# Patient Record
Sex: FEMALE | Race: WHITE | Employment: FULL TIME | URBAN - METROPOLITAN AREA
[De-identification: names, ages, dates, MRNs, and addresses within clinical notes are randomized per-mention and may not be internally consistent; named-entity substitution may affect disease eponyms.]

---

## 2017-08-12 ENCOUNTER — HOSPITAL ENCOUNTER (OUTPATIENT)
Dept: MAMMOGRAPHY | Age: 42
Discharge: HOME | End: 2017-08-12
Attending: OBSTETRICS & GYNECOLOGY
Payer: COMMERCIAL

## 2017-08-12 DIAGNOSIS — Z12.31 VISIT FOR SCREENING MAMMOGRAM: ICD-10-CM

## 2017-08-12 PROCEDURE — 77067 SCR MAMMO BI INCL CAD: CPT

## 2018-10-05 ENCOUNTER — HOSPITAL ENCOUNTER (OUTPATIENT)
Dept: MAMMOGRAPHY | Age: 43
Discharge: HOME | End: 2018-10-05
Attending: OBSTETRICS & GYNECOLOGY
Payer: COMMERCIAL

## 2018-10-05 DIAGNOSIS — Z12.31 VISIT FOR SCREENING MAMMOGRAM: ICD-10-CM

## 2018-10-05 PROCEDURE — 77063 BREAST TOMOSYNTHESIS BI: CPT

## 2018-11-13 PROBLEM — E66.01 OBESITY, MORBID (HCC): Status: ACTIVE | Noted: 2018-11-13

## 2020-03-07 ENCOUNTER — HOSPITAL ENCOUNTER (OUTPATIENT)
Dept: MAMMOGRAPHY | Age: 45
Discharge: HOME | End: 2020-03-07
Attending: OBSTETRICS & GYNECOLOGY
Payer: COMMERCIAL

## 2020-03-07 DIAGNOSIS — Z12.31 VISIT FOR SCREENING MAMMOGRAM: ICD-10-CM

## 2020-03-07 PROCEDURE — 77063 BREAST TOMOSYNTHESIS BI: CPT

## 2020-04-28 VITALS — HEIGHT: 61 IN | WEIGHT: 250 LBS | BODY MASS INDEX: 47.2 KG/M2

## 2020-04-28 RX ORDER — MELATONIN
2000 DAILY
COMMUNITY

## 2020-04-28 NOTE — PERIOP NOTES
Patient verified name and . Order for consent NOT found in EHR; patient verifies procedure. Type 1B surgery, phone assessment complete. Orders NOT received. Labs per surgeon: unknown; no orders received in EHR at time of assessment. Labs per anesthesia protocol: none needed. Patient answered medical/surgical history questions at their best of ability. All prior to admission medications documented in [facility]. Patient instructed to take the following medications the day of surgery according to anesthesia guidelines with a small sip of water: none. Hold all vitamins 7 days prior to surgery and NSAIDS 5 days prior to surgery. Prescription meds to hold: none. Patient instructed on the following:  Visitor policy- 1 visitor per per patient. Arrive at [facility], time of arrival to be called the day before by 1700  NPO after midnight including gum, mints, and ice chips  Responsible adult must drive patient to the hospital, stay during surgery, and patient will need supervision 24 hours after anesthesia  Use dial antibacterial soap in shower the night before surgery and on the morning of surgery  All piercings must be removed prior to arrival.    Leave all valuables (money and jewelry) at home but bring insurance card and ID on DOS. Do not wear make-up, nail polish, lotions, cologne, perfumes, powders, or oil on skin. Patient teach back successful and patient demonstrates knowledge of instruction.

## 2020-05-01 ENCOUNTER — HOSPITAL ENCOUNTER (OUTPATIENT)
Dept: SURGERY | Age: 45
Discharge: HOME | End: 2020-05-01

## 2020-05-03 PROBLEM — N92.3 IMB (INTERMENSTRUAL BLEEDING): Status: ACTIVE | Noted: 2020-02-04

## 2020-05-03 RX ORDER — SODIUM CHLORIDE 0.9 % (FLUSH) 0.9 %
5-40 SYRINGE (ML) INJECTION EVERY 8 HOURS
Status: CANCELLED | OUTPATIENT
Start: 2020-05-03

## 2020-05-03 RX ORDER — SODIUM CHLORIDE 0.9 % (FLUSH) 0.9 %
5-40 SYRINGE (ML) INJECTION AS NEEDED
Status: CANCELLED | OUTPATIENT
Start: 2020-05-03

## 2020-05-03 NOTE — H&P (VIEW-ONLY)
Pre op  Exam 
 
Alexandra Awan 1975 
 
38 y.o. 
[id number] Presents for pre op. Periods occur every 2 weeks with spotting ~1-2 days between periods. CD 1 & 2 menorrhagia, flow lasts ~6 days. Sometimes has cramping when she is not having her period Problem List  Date Reviewed: 5/3/2020 Codes Class Noted IMB (intermenstrual bleeding) ICD-10-CM: N92.3 ICD-9-CM: 626.6  2020 Overview Addendum 5/3/2020 12:11 PM by Con Dennis MD  
  2020:  Regular menses w/ IMB. Currently is not sexually active. A/P:  Menorrhagia and IMB/spotting. Recheck US and same day EMB. Pt very uncomfortable w/ SSE, Rxs for pre-procedure analgesia given. Pt off work 20, agrees to come in for US/EMB that day. 2020  HPI: BMI 51, IMB, menorrhagia, took analgesia and used Cytotec last night A/P:  1. Menorrhagia, IMB, BMI 51 unable to do EMB, schedule D & C Hysteroscopy with Mirena IUD placement for control of VB, has not been sexually active for several years. Insert Cytotec 800 mcg intravaginal the night before surgery (No impact w/ 400 mcg night before  attempted EMB) US today:  ENLARGED HETEROGENEOUS UTERUS 9.4/5/5 cm WITH 3 FIBROIDS FIBROIDS 1,2 - INTRAMURAL FIBROID 3- SUBSEROSAL (BETTER DETAIL WITH ABD APPROACH) ENDOMETRIUM- 7.4MM RT OV WNL 
LT OV WNL 
RT/LT OVS SEEN WITH BETTER DETAIL WITH ABD APPROACH 
MIN FREE FLUID 
  
postmenopausal w/ VB:  Thick endometrium (> 5 mm)-->7%  risk of cancer 
thin endometrium  (< or = 5 mm)---> < 0.07% cancer risk. Obesity, morbid ([address] ICD-10-CM: E66.01 
ICD-9-CM: 278.01  2018 Patient's last menstrual period was 2020 (approximate). The current method of family planning is abstinence. Last pap:   nl 
 
Endometrial Biopsy:  Could not be collected/failed procedure. OB History  Para Term  AB Living 1 1 1 0 0 1 SAB TAB Ectopic Molar Multiple Live Births 0 0 0 0 0 1 # Outcome Date GA Lbr Gurinder/2nd Weight Sex Delivery Anes PTL Lv  
1 Term 05 40w0d  9 lb 8 oz (4.309 kg) M  Spinal  CLAIR Name: Mikey Barker Past Medical History:  
Diagnosis Date  BMI 50.0-59.9, adult ([facility] [address] 2019 BMI=47.3  Hypertension   
 on med for control Past Surgical History:  
Procedure Laterality Date  HX  SECTION  2005  HX CHOLECYSTECTOMY  2017  HX ORTHOPAEDIC Right 1984  
 tumor removed from foot  HX OTHER SURGICAL  2006 SKIN CANCER REMOVAL Review of Systems Constitutional: Negative. HENT: Negative. Eyes: Negative. Respiratory: Negative. Cardiovascular: Negative. Gastrointestinal: negative Genitourinary: IMB, pelvic pain Musculoskeletal: Negative. Skin: Negative. Neurological: Negative. Endo/Heme/Allergies: Negative. Psychiatric/Behavioral: Negative. Physical Exam: 
Visit Vitals /74 Ht 5' 1\" (1.549 m) Wt 265 lb (120.2 kg) LMP 2020 (Approximate) BMI 50.07 kg/m² Yulisa Valdivia is a 38 y.o. female who appears pleasant, in no apparent distress, her given age, well developed, well nourished and with good attention to hygiene and body habitus. Oriented to person, place and time. Mood and affect normal and appropriate to situation. Head is normocephalic, atraumatic, without any gross head or neck masses. Neck: Neck exam reveals no abnormalities. Thyroid examination reveals no abnormalities. Lymph nodes:  
Neck lymph nodes are normal.  
No supraclavicular lymphadenopathy noted. No axillary lymphadenopathy noted. No groin lymphadenopathy noted. Respiratory: Assessment of respiratory effort reveals even and non labored respirations. Lungs CTA. Cardiovascular: Heart auscultation reveals no murmurs, gallop, rubs or clicks. Skin: No skin rash, abnormal appearing nevi, subcutaneous nodules, lesions or ulcers observed. Abdomen: Abdomen soft, non tender, bowel sounds present x 4 without palpable masses. Examination for hernia is negative. Palpation of liver reveals no abnormalities with respect to size, tenderness or masses. Palpation of spleen reveals no abnormalities with respect to size, tenderness or masses. Genitourinary:  
External genitalia are normal in appearance. Examination of urethral meatus reveals location normal and size normal.  
Examination of urethra shows no abnormalities. Examination of vaginal vault reveals no abnormalities. Cervix: shows no pathology. Uterus, adnexa, parametria: Suboptimal exam due to pt body habitus and patient anxiety/tightening abdominal muscles Examination of anus and perineum shows no abnormalities. Rectal exam reveals normal sphincter tone without presence of hemorrhoids or masses. ASSESSMENT and PLAN 1. Pre op, IMB & pelvic pain:  D& C Hysteroscopy with Mirena IUD insertion. She understands that complications aren't anticipated however if complications occur they could necessitate:   transfusion, MARYCARMEN, ureteral stint, prolonged dey drainage, colostomy &/or other procedures as indicated. Pt understands/consents. Post op course  also discussed. Advised pelvic rest and not to engage in strenuous activity until her post op check ~ 2 wk post op. No driving for the first 48-72 hours post op or if she is taking narcotic pain medication. Pt is aware she will have some light VB for several days to weeks post op. All questions answered. Infection precautions reviewed. IUD counseling 
IUD counseling:  d/w pt risk for:  infection and ectopic, signs and sx of each reviewed. Increased risk for PID with possible subsequent infertility discussed. The importance of a monogamous relationship was stressed.   Pt advised to check the IUD string monthly to ensure it is in place/hasn't migrated (pt to palpate string length and let me know if it feels like it is getting shorter or longer). Suggested she limit tampon use (if not too inconvenient) to prevent fibers from entangling with the IUD string/displacing the IUD during tampon removal. 
 
Common bleeding patterns with mirena IUDs reviewed. F/u 4 wk w/ US to verify IUD position. Will be accompanied by her mom, Beatrice Black. Typical bleeding patterns with Mirena IUD reviewed. Orders Placed This Encounter  CULTURE, URINE  CBC WITH AUTOMATED DIFF  
 METABOLIC PANEL, COMPREHENSIVE  miSOPROStoL (CYTOTEC) 200 mcg tablet Sig: Insert 4 tablets by vaginal route the night before procedure Dispense:  4 Tab Refill:  0  
 ibuprofen (MOTRIN) 800 mg tablet Sig: Take 1 Tab by mouth every eight (8) hours as needed for Pain. Dispense:  15 Tab Refill:  0  
 oxyCODONE-acetaminophen (PERCOCET 7.5) 7.5-325 mg per tablet Sig: Take 1 Tab by mouth every six (6) hours as needed for Pain for up to 3 days. Max Daily Amount: 4 Tabs. Take 1-1 1/2 tablet po Dispense:  15 Tab Refill:  0 Signed by:  Akiko Gould MD, [facility]

## 2020-05-07 ENCOUNTER — ANESTHESIA EVENT (OUTPATIENT)
Dept: SURGERY | Age: 45
End: 2020-05-07
Payer: COMMERCIAL

## 2020-05-07 RX ORDER — FENTANYL CITRATE 50 UG/ML
100 INJECTION, SOLUTION INTRAMUSCULAR; INTRAVENOUS ONCE
Status: CANCELLED | OUTPATIENT
Start: 2020-05-07 | End: 2020-05-07

## 2020-05-07 NOTE — PERIOP NOTES
NOVEL CORONAVIRUS (COVID-19) [[id number]] (Order [id number])   Lab   Date: 4/30/2020 Department: [address] [place] [address] Ordering/Authorizing: Tim Barrett MD   Result Notes for NOVEL CORONAVIRUS (COVID-19)     Notes recorded by Jame Pryor on 5/7/2020 at 9:29 AM EDT  Patient notified regarding lab results. Deboyelitza Radforda verbalized understanding.  ------    Notes recorded by Tim Barrett MD on 5/6/2020 at 5:13 PM EDT  Notify pt cx is negative.   Thanks.          Component Value Flag Ref Range Units Status   SARS-CoV-2, JODEE Not Detected

## 2020-05-08 ENCOUNTER — ANESTHESIA (OUTPATIENT)
Dept: SURGERY | Age: 45
End: 2020-05-08
Payer: COMMERCIAL

## 2020-05-08 ENCOUNTER — HOSPITAL ENCOUNTER (OUTPATIENT)
Age: 45
Setting detail: OUTPATIENT SURGERY
Discharge: HOME | End: 2020-05-08
Attending: OBSTETRICS & GYNECOLOGY | Admitting: OBSTETRICS & GYNECOLOGY
Payer: COMMERCIAL

## 2020-05-08 VITALS
WEIGHT: 262 LBS | HEIGHT: 61 IN | RESPIRATION RATE: 14 BRPM | OXYGEN SATURATION: 96 % | DIASTOLIC BLOOD PRESSURE: 60 MMHG | TEMPERATURE: 98.4 F | BODY MASS INDEX: 49.47 KG/M2 | SYSTOLIC BLOOD PRESSURE: 123 MMHG | HEART RATE: 74 BPM

## 2020-05-08 LAB
ABO + RH BLD: NORMAL
BLOOD GROUP ANTIBODIES SERPL: NORMAL
HCG UR QL: NEGATIVE
SPECIMEN EXP DATE BLD: NORMAL

## 2020-05-08 PROCEDURE — 76010000138 HC OR TIME 0.5 TO 1 HR: Performed by: OBSTETRICS & GYNECOLOGY

## 2020-05-08 PROCEDURE — 77030018836 HC SOL IRR NACL ICUM -A: Performed by: OBSTETRICS & GYNECOLOGY

## 2020-05-08 PROCEDURE — 74011250636 HC RX REV CODE- 250/636: Performed by: ANESTHESIOLOGY

## 2020-05-08 PROCEDURE — 76210000006 HC OR PH I REC 0.5 TO 1 HR: Performed by: OBSTETRICS & GYNECOLOGY

## 2020-05-08 PROCEDURE — 88305 TISSUE EXAM BY PATHOLOGIST: CPT

## 2020-05-08 PROCEDURE — 74011000250 HC RX REV CODE- 250: Performed by: NURSE ANESTHETIST, CERTIFIED REGISTERED

## 2020-05-08 PROCEDURE — 81025 URINE PREGNANCY TEST: CPT

## 2020-05-08 PROCEDURE — 74011000250 HC RX REV CODE- 250: Performed by: ANESTHESIOLOGY

## 2020-05-08 PROCEDURE — 74011250636 HC RX REV CODE- 250/636: Performed by: NURSE ANESTHETIST, CERTIFIED REGISTERED

## 2020-05-08 PROCEDURE — 77030039425 HC BLD LARYNG TRULITE DISP TELE -A: Performed by: ANESTHESIOLOGY

## 2020-05-08 PROCEDURE — 77030037088 HC TUBE ENDOTRACH ORAL NSL COVD-A: Performed by: ANESTHESIOLOGY

## 2020-05-08 PROCEDURE — 77030019927 HC TBNG IRR CYSTO BAXT -A: Performed by: OBSTETRICS & GYNECOLOGY

## 2020-05-08 PROCEDURE — 76060000032 HC ANESTHESIA 0.5 TO 1 HR: Performed by: OBSTETRICS & GYNECOLOGY

## 2020-05-08 PROCEDURE — 76210000020 HC REC RM PH II FIRST 0.5 HR: Performed by: OBSTETRICS & GYNECOLOGY

## 2020-05-08 PROCEDURE — 86900 BLOOD TYPING SEROLOGIC ABO: CPT

## 2020-05-08 RX ORDER — SUCCINYLCHOLINE CHLORIDE 20 MG/ML
INJECTION INTRAMUSCULAR; INTRAVENOUS AS NEEDED
Status: DISCONTINUED | OUTPATIENT
Start: 2020-05-08 | End: 2020-05-08 | Stop reason: HOSPADM

## 2020-05-08 RX ORDER — OXYCODONE HYDROCHLORIDE 5 MG/1
5 TABLET ORAL
Status: DISCONTINUED | OUTPATIENT
Start: 2020-05-08 | End: 2020-05-08 | Stop reason: HOSPADM

## 2020-05-08 RX ORDER — DEXAMETHASONE SODIUM PHOSPHATE 4 MG/ML
INJECTION, SOLUTION INTRA-ARTICULAR; INTRALESIONAL; INTRAMUSCULAR; INTRAVENOUS; SOFT TISSUE AS NEEDED
Status: DISCONTINUED | OUTPATIENT
Start: 2020-05-08 | End: 2020-05-08 | Stop reason: HOSPADM

## 2020-05-08 RX ORDER — FENTANYL CITRATE 50 UG/ML
INJECTION, SOLUTION INTRAMUSCULAR; INTRAVENOUS AS NEEDED
Status: DISCONTINUED | OUTPATIENT
Start: 2020-05-08 | End: 2020-05-08 | Stop reason: HOSPADM

## 2020-05-08 RX ORDER — LIDOCAINE HYDROCHLORIDE 20 MG/ML
INJECTION, SOLUTION EPIDURAL; INFILTRATION; INTRACAUDAL; PERINEURAL AS NEEDED
Status: DISCONTINUED | OUTPATIENT
Start: 2020-05-08 | End: 2020-05-08 | Stop reason: HOSPADM

## 2020-05-08 RX ORDER — HYDROMORPHONE HYDROCHLORIDE 2 MG/ML
0.5 INJECTION, SOLUTION INTRAMUSCULAR; INTRAVENOUS; SUBCUTANEOUS
Status: DISCONTINUED | OUTPATIENT
Start: 2020-05-08 | End: 2020-05-08 | Stop reason: HOSPADM

## 2020-05-08 RX ORDER — ONDANSETRON 2 MG/ML
INJECTION INTRAMUSCULAR; INTRAVENOUS AS NEEDED
Status: DISCONTINUED | OUTPATIENT
Start: 2020-05-08 | End: 2020-05-08 | Stop reason: HOSPADM

## 2020-05-08 RX ORDER — KETOROLAC TROMETHAMINE 30 MG/ML
INJECTION, SOLUTION INTRAMUSCULAR; INTRAVENOUS AS NEEDED
Status: DISCONTINUED | OUTPATIENT
Start: 2020-05-08 | End: 2020-05-08 | Stop reason: HOSPADM

## 2020-05-08 RX ORDER — PROPOFOL 10 MG/ML
INJECTION, EMULSION INTRAVENOUS AS NEEDED
Status: DISCONTINUED | OUTPATIENT
Start: 2020-05-08 | End: 2020-05-08 | Stop reason: HOSPADM

## 2020-05-08 RX ORDER — ROCURONIUM BROMIDE 10 MG/ML
INJECTION, SOLUTION INTRAVENOUS AS NEEDED
Status: DISCONTINUED | OUTPATIENT
Start: 2020-05-08 | End: 2020-05-08 | Stop reason: HOSPADM

## 2020-05-08 RX ORDER — MIDAZOLAM HYDROCHLORIDE 1 MG/ML
2 INJECTION, SOLUTION INTRAMUSCULAR; INTRAVENOUS
Status: DISCONTINUED | OUTPATIENT
Start: 2020-05-08 | End: 2020-05-08 | Stop reason: HOSPADM

## 2020-05-08 RX ORDER — SODIUM CHLORIDE, SODIUM LACTATE, POTASSIUM CHLORIDE, CALCIUM CHLORIDE 600; 310; 30; 20 MG/100ML; MG/100ML; MG/100ML; MG/100ML
75 INJECTION, SOLUTION INTRAVENOUS CONTINUOUS
Status: DISCONTINUED | OUTPATIENT
Start: 2020-05-08 | End: 2020-05-08 | Stop reason: HOSPADM

## 2020-05-08 RX ORDER — LIDOCAINE HYDROCHLORIDE 10 MG/ML
0.1 INJECTION INFILTRATION; PERINEURAL AS NEEDED
Status: DISCONTINUED | OUTPATIENT
Start: 2020-05-08 | End: 2020-05-08 | Stop reason: HOSPADM

## 2020-05-08 RX ORDER — HYDROCODONE BITARTRATE AND ACETAMINOPHEN 5; 325 MG/1; MG/1
2 TABLET ORAL AS NEEDED
Status: DISCONTINUED | OUTPATIENT
Start: 2020-05-08 | End: 2020-05-08 | Stop reason: HOSPADM

## 2020-05-08 RX ADMIN — SUCCINYLCHOLINE CHLORIDE 200 MG: 20 INJECTION, SOLUTION INTRAMUSCULAR; INTRAVENOUS at 08:27

## 2020-05-08 RX ADMIN — DEXAMETHASONE SODIUM PHOSPHATE 10 MG: 4 INJECTION, SOLUTION INTRAMUSCULAR; INTRAVENOUS at 08:37

## 2020-05-08 RX ADMIN — ROCURONIUM BROMIDE 10 MG: 10 INJECTION, SOLUTION INTRAVENOUS at 08:27

## 2020-05-08 RX ADMIN — LIDOCAINE HYDROCHLORIDE 100 MG: 20 INJECTION, SOLUTION EPIDURAL; INFILTRATION; INTRACAUDAL; PERINEURAL at 08:27

## 2020-05-08 RX ADMIN — LIDOCAINE HYDROCHLORIDE 0.1 ML: 10 INJECTION, SOLUTION INFILTRATION; PERINEURAL at 07:20

## 2020-05-08 RX ADMIN — ONDANSETRON 4 MG: 2 INJECTION INTRAMUSCULAR; INTRAVENOUS at 08:37

## 2020-05-08 RX ADMIN — KETOROLAC TROMETHAMINE 30 MG: 30 INJECTION, SOLUTION INTRAMUSCULAR; INTRAVENOUS at 09:04

## 2020-05-08 RX ADMIN — PROPOFOL 200 MG: 10 INJECTION, EMULSION INTRAVENOUS at 08:27

## 2020-05-08 RX ADMIN — SODIUM CHLORIDE, SODIUM LACTATE, POTASSIUM CHLORIDE, AND CALCIUM CHLORIDE 75 ML/HR: 600; 310; 30; 20 INJECTION, SOLUTION INTRAVENOUS at 07:20

## 2020-05-08 RX ADMIN — FENTANYL CITRATE 50 MCG: 50 INJECTION INTRAMUSCULAR; INTRAVENOUS at 08:27

## 2020-05-08 RX ADMIN — FENTANYL CITRATE 50 MCG: 50 INJECTION INTRAMUSCULAR; INTRAVENOUS at 08:40

## 2020-05-08 RX ADMIN — SODIUM CHLORIDE, SODIUM LACTATE, POTASSIUM CHLORIDE, AND CALCIUM CHLORIDE: 600; 310; 30; 20 INJECTION, SOLUTION INTRAVENOUS at 09:05

## 2020-05-08 NOTE — ANESTHESIA PREPROCEDURE EVALUATION
Relevant Problems   No relevant active problems       Anesthetic History   No history of anesthetic complications            Review of Systems / Medical History  Patient summary reviewed and pertinent labs reviewed    Pulmonary  Within defined limits                 Neuro/Psych   Within defined limits           Cardiovascular    Hypertension: well controlled              Exercise tolerance: >4 METS     GI/Hepatic/Renal  Within defined limits              Endo/Other        Morbid obesity     Other Findings              Physical Exam    Airway  Mallampati: II  TM Distance: 4 - 6 cm  Neck ROM: normal range of motion   Mouth opening: Normal     Cardiovascular  Regular rate and rhythm,  S1 and S2 normal,  no murmur, click, rub, or gallop             Dental    Dentition: Full upper dentures     Pulmonary  Breath sounds clear to auscultation               Abdominal         Other Findings            Anesthetic Plan    ASA: 3  Anesthesia type: general          Induction: Intravenous  Anesthetic plan and risks discussed with: Patient

## 2020-05-08 NOTE — DISCHARGE INSTRUCTIONS
Dilation and Curettage (D&C): What to Expect at Home  Your Recovery  Dilation and curettage (D&C) is a procedure to remove tissue from the inside of the uterus. The doctor used a curved tool, called a curette, to gently scrape tissue from your uterus. You are likely to have a backache, or cramps similar to menstrual cramps, and pass small clots of blood from your vagina for the first few days. You may continue to have light vaginal bleeding for several weeks after the procedure. You will probably be able to go back to most of your normal activities in 1 or 2 days. This care sheet gives you a general idea about how long it will take for you to recover. But each person recovers at a different pace. Follow the steps below to get better as quickly as possible. How can you care for yourself at home? Activity  · Rest when you feel tired. Getting enough sleep will help you recover. · Avoid strenuous activities, such as bicycle riding, jogging, weight lifting, or aerobic exercise, until your doctor says it is okay. · Most women are able to return to work the day after the procedure. · You may have some light vaginal bleeding. Wear sanitary pads if needed. Do not douche or use tampons for 2 weeks or until your doctor says it is okay. · Ask your doctor when it is okay for you to have sex. · If you could become pregnant, talk about birth control with your doctor. Do not try to become pregnant until your doctor says it is okay. Diet  · You can eat your normal diet. If your stomach is upset, try bland, low-fat foods like plain rice, broiled chicken, toast, and yogurt. · Drink plenty of fluids (unless your doctor tells you not to). Medicines  · Take pain medicines exactly as directed. ¨ If the doctor gave you a prescription medicine for pain, take it as prescribed. ¨ If you are not taking a prescription pain medicine, ask your doctor if you can take an over-the-counter medicine.   · If you think your pain medicine is making you sick to your stomach:  ¨ Take your medicine after meals (unless your doctor has told you not to). ¨ Ask your doctor for a different pain medicine. · If your doctor prescribed antibiotics, take them as directed. Do not stop taking them just because you feel better. You need to take the full course of antibiotics. Follow-up care is a key part of your treatment and safety. Be sure to make and go to all appointments, and call your doctor if you are having problems. Its also a good idea to know your test results and keep a list of the medicines you take. When should you call for help? Call 911 anytime you think you may need emergency care. For example, call if:  · You passed out (lost consciousness). · You have severe trouble breathing. · You have chest pain and shortness of breath, or you cough up blood. · You have severe pain in your belly. Call your doctor now or seek immediate medical care if:  · You have bright red vaginal bleeding that soaks one or more pads each hour for 2 or more hours. · You pass blood clots that are larger than a golf ball. · You have vaginal discharge that smells bad. · You are sick to your stomach or cannot keep fluids down. · You have pain that does not get better after you take pain medicine. · You have pain that is getting worse 2 days after the procedure. · You have a fever over 100°F.  · Your belly feels tender, or full and hard. Watch closely for changes in your health, and be sure to contact your doctor if:  You do not get better as expected.     After general anesthesia or intravenous sedation, for 24 hours or while taking prescription Narcotics:  · Limit your activities  · A responsible adult needs to be with you for the next 24 hours  · Do not drive and operate hazardous machinery  · Do not make important personal or business decisions  · Do not drink alcoholic beverages  · If you have not urinated within 8 hours after discharge, please contact your surgeon on call. · If you have sleep apnea and have a CPAP machine, please use it for all naps and sleeping. · Please use caution when taking narcotics and any of your home medications that may cause drowsiness. *  Please give a list of your current medications to your Primary Care Provider. *  Please update this list whenever your medications are discontinued, doses are      changed, or new medications (including over-the-counter products) are added. *  Please carry medication information at all times in case of emergency situations. These are general instructions for a healthy lifestyle:  No smoking/ No tobacco products/ Avoid exposure to second hand smoke  Surgeon General's Warning:  Quitting smoking now greatly reduces serious risk to your health. Obesity, smoking, and sedentary lifestyle greatly increases your risk for illness  A healthy diet, regular physical exercise & weight monitoring are important for maintaining a healthy lifestyle    You may be retaining fluid if you have a history of heart failure or if you experience any of the following symptoms:  Weight gain of 3 pounds or more overnight or 5 pounds in a week, increased swelling in our hands or feet or shortness of breath while lying flat in bed. Please call your doctor as soon as you notice any of these symptoms; do not wait until your next office visit. Patient Education        Intrauterine Device (IUD) for Birth Control: Care Instructions  Your Care Instructions    The intrauterine device (IUD) is used to prevent pregnancy. It's a small, plastic, T-shaped device. Your doctor places the IUD in your uterus. If you and your doctor discuss it before you give birth, this can be done right after you have your baby. You are using either a hormonal IUD or a copper IUD. · Hormonal IUDs prevent pregnancy for 3 to 6 years, depending on which IUD is used. But your doctor may talk to you about leaving it in for longer.  Once you have it, you don't have to do anything else to prevent pregnancy. · The copper IUD prevents pregnancy for 10 years. But your doctor may talk to you about leaving it in for longer. Once you have it, you don't have to do anything to prevent pregnancy. The IUD usually stays in the uterus until your doctor removes it. Follow-up care is a key part of your treatment and safety. Be sure to make and go to all appointments, and call your doctor if you are having problems. It's also a good idea to know your test results and keep a list of the medicines you take. How can you care for yourself at home? How do you use the IUD? · Your doctor inserts the IUD. This takes only a few minutes and can be done at your doctor's office. If you and your doctor discuss it before you give birth, the IUD can also be inserted right after you have your baby. · Your doctor may have you feel for the IUD string right after insertion, to be sure you know what it feels like. · If you want to check for the string on your own:  ? Insert a finger into your vagina and feel for the cervix, which is at the top of the vagina and feels harder than the rest of your vagina (some women say it feels like the tip of your nose). ? You should be able to feel the thin, plastic string coming out of the opening of your cervix. If you cannot feel the string, it doesn't always mean that the IUD is out of place. Sometimes the string is just difficult to feel or has been pulled up into the cervical canal (which will not harm you). · Your doctor may want to see you 4 to 6 weeks after the IUD insertion, to make sure it is in place. What if you think the IUD is not in place? Always read the label for specific instructions. Here are some basic guidelines:  · Call your doctor and use backup birth control, such as a condom, or don't have intercourse until you know the IUD is working. · If you had intercourse, you can use emergency contraception to help prevent pregnancy.  The most effective emergency contraception is prescribed by a doctor. This includes a prescription pill. If your IUD is no longer in place, a doctor may be able to insert a copper IUD as emergency contraception. You can also get emergency contraceptive pills without a prescription at [facility]. What else do you need to know? · It's safe to use while breastfeeding. · The IUD can have side effects. ? The hormonal IUD usually reduces menstrual flow and cramping over time. It can also cause spotting, mood swings, and breast tenderness. ? The copper IUD can cause longer and heavier periods. · After an IUD is first put in, you may have some mild cramping and light spotting for 1 to 2 days. · The IUD doesn't protect against sexually transmitted infections (STIs), such as herpes or HIV/AIDS. If you're not sure whether your sex partner might have an STI, use a condom to protect against disease. When should you call for help? Call your doctor now or seek immediate medical care if:    · You have pain in your belly or pelvis.     · You have severe vaginal bleeding. This means that you are soaking through your usual pads or tampons each hour for 2 or more hours.     · You have vaginal discharge that smells bad.     · You have a fever.    Watch closely for changes in your health, and be sure to contact your doctor if you have any problems. Where can you learn more? Go to http://cyndie-rebecca.info/  Enter H796 in the search box to learn more about \"Intrauterine Device (IUD) for Birth Control: Care Instructions. \"  Current as of: May 29, 2019Content Version: 12.4  © 8128-1533 [employer]wise, [employer]orporated. Care instructions adapted under license by [employer] (which disclaims liability or warranty for this information).  If you have questions about a medical condition or this instruction, always ask your healthcare professional. [address] any warranty or liability for your use of this information.

## 2020-05-08 NOTE — OP NOTES
Op note      Patient ID:  Ariane Benedict  [id number]  38 y.o.  1975      Pre op dx :  Perimenopausal menometrorrhagia  Post op dx:  DAVID with polyp. Procedure:  Fractional D & C Hysteroscopy & insertion Mirena IUD  Surgeon:  Rosario  EBL:  Less than 25 cc  Complications:  none  anesthesia:  GETA  Specimens:  Polyp, endocervical and endometrial curettings to path  Findings:  EUA wnl, external genitalia nl appearing, no lesions, vagina pink  w/ estrogen effect, cervix nl appearing, no lesions, Difficult bimanual exam secondary to pt's BMI, No uterine or adnexal masses appreciated. ECC--> glandular tissue noted,  endocervical canal angled anteriorly sharply,   Endometrial cavity sounded to  9 cm,  tissue likely polyp noted, difficult to determine location precisely due to it's size. Possibly at the junction of the internal os and endometrial cavity, or completely  In the endometrial cavity. Polyp almost filled the cavity. Bilateral ostia visualized. Procedure:  After obtaining informed consent pt was taken to the OR where anesthesia was obtained via   GETA. Pt prepped & draped in the usual sterile fashion. EUA performed with findings as noted above. Weighted speculum placed in the vagina. Single tooth tenaculum grasped the anterior lip of the cervix. Uterine sound could not be advanced due to the angle of the canal.  Hysteroscope inserted and the internal os visualized, Sound angled appropriately and  uterus sounded to 9 cm. Cervix sequentially dilated with Tyrese Skates dilators. Endocervical canal was sharply curetted & specimen submitted separate from endometrial curettings. Hysteroscope advanced under direct visualization. Cavity surveyed in a panoramic fashion with above findings noted. Bilateral ostia visualized. Hysteroscope removed & cavity sharply curetted, endometrial curettings obtained. Tissue easily obtained. Hysteroscope again placed. No evidence of uterine perforation.   Tala Stone forcep was advanced and the polyp grasped and removed w/ gentle traction using a twisting motion. Cavity again sharply curetted w/ additional tissue obtained. Hysteroscope was advanced again under direct visualization & again no perforation was noted. Hysteroscopy was  alternated with sharp curettage until there were no residual tissue remnants suspicious for polyp noted. Mirena IUD subsequently inserted without complication. The strings were trimmed to 4 cm beyond the external os. Single tooth tenaculum was removed from the cervix after withdrawing the hysteroscope. Good hemostasis was noted @ the tenaculum site & there was minimal VB noted. Patient was returned to the dorsal supine position, awakened and transferred to the recovery room in stable condition. I spoke with her sister, Meghan Lopez, via telephone postoperatively (covid 19 precautions, no family allowed in the hospital) and events of surgery were reviewed. She will follow up with me in 2-4 weeks, sooner should she have any complications thought to be related to her procedure. I reviewed the importance of:  pelvic rest, nothing in the vagina, no:  sex, tampons douches, tub baths or swimming for 2 weeks. Also reviewed:  infection, driving and physical activity precautions. Pt was given prescriptions for:  Motrin 800 mg po q 8 prn pain dispense # 15 NR and Percocet 7.5 mg 1-2 po q 4-6 prn pain disp # 15 NR.

## 2020-05-08 NOTE — DISCHARGE SUMMARY
Discharge Summary     Name: Ariane Benedict MRN: [id number]  SSN: [id number]    YOB: 1975  Age: 38 y.o. Sex: female      Allergies: Patient has no known allergies. Admit Date: 5/8/2020    Discharge Date: 5/8/2020      Admitting Physician: Onur Ulrich MD     * Admission Diagnoses: Menorrhagia and Metrorrhagia    * Discharge Diagnoses:   Hospital Problems as of 5/8/2020 Date Reviewed: 5/3/2020    None           * Procedures: Fractional D & C Hysteroscoy with excision of  polyp and endometrial curettage. And insertion Mirena IUD    * Discharge Condition: Good    [facility] Course: Normal hospital course for this procedure. Significant Diagnostic Studies:   Recent Results (from the past 24 hour(s))   TYPE & SCREEN    Collection Time: 05/08/20  7:23 AM   Result Value Ref Range    Crossmatch Expiration 05/11/2020     ABO/Rh(D) Ollis Hosteller POSITIVE     Antibody screen NEG    HCG URINE, QL. - POC    Collection Time: 05/08/20  7:34 AM   Result Value Ref Range    Pregnancy test,urine (POC) Negative NEG         * Disposition: Home    Discharge Medications:   Current Discharge Medication List      CONTINUE these medications which have NOT CHANGED    Details   ibuprofen (MOTRIN) 800 mg tablet Take 1 Tab by mouth every eight (8) hours as needed for Pain. Qty: 15 Tab, Refills: 0      cholecalciferol (Vitamin D3) (1000 Units /25 mcg) tablet Take 2,000 Units by mouth daily. telmisartan (MICARDIS) 40 mg tablet Take 40 mg by mouth daily. Indications: high blood pressure         STOP taking these medications       miSOPROStoL (CYTOTEC) 200 mcg tablet Comments:   Reason for Stopping:                * Follow-up Care/Patient Instructions: Activity: No sex, douching, or tampons for 6 weeks or as directed by your physician. No heavy lifting for 6 weeks. No driving while taking pain medication.   Diet: Resume pre-hospital diet  Wound Care: As directed    D/w pt:  Infx/driving/physical activity/pelvic rest precautions    Pelvic rest until office f/u ( no sex, swimming, tampons or douching)  Pt may drive as long as she is NOT taking narcotics & can quickly maneuver her foot from the gas to the brake. For the next 1-2 weeks:  eat, shower and advance activity as tolerated. Notify [facility] for temp > 100.5, vaginal discharge with odor, heavy VB, worsening pelvic/abdominal pain and/or other concerns. Follow-up Information     Follow up With Specialties Details Why Cyril Neal MD Obstetrics & Gynecology, Gynecology, Obstetrics Follow up Call to schedule follow up as directed.  [address]  [phone]      [place] [address] [facility],First Floor  [phone]

## 2020-05-08 NOTE — INTERVAL H&P NOTE
Update History & Physical 
 
The Patient's History and Physical of May 8, 2020 was reviewed with the patient and I examined the patient. There was no change. The surgical site was confirmed by the patient and me. Plan:  The risk, benefits, expected outcome, and alternative to the recommended procedure have been discussed with the patient. Patient understands and wants to proceed with the procedure.  
 
Electronically signed by Thong Pierson MD on 5/8/2020 at 8:14 AM

## 2020-05-08 NOTE — ANESTHESIA POSTPROCEDURE EVALUATION
Procedure(s):  DILATATION AND CURETTAGE HYSTEROSCOPY/ INSERTION OF INTRAUTERINE DEVICE PATIENT REQUEST PAULO CROWDER AND Willy Menon.     general    Anesthesia Post Evaluation      Multimodal analgesia: multimodal analgesia used between 6 hours prior to anesthesia start to PACU discharge  Patient location during evaluation: bedside  Patient participation: complete - patient participated  Level of consciousness: awake and alert  Pain score: 3  Pain management: adequate  Airway patency: patent  Anesthetic complications: no  Cardiovascular status: acceptable and hemodynamically stable  Respiratory status: acceptable  Hydration status: acceptable  Post anesthesia nausea and vomiting:  none      Vitals Value Taken Time   /66 5/8/2020  9:47 AM   Temp 36.9 °C (98.4 °F) 5/8/2020  9:17 AM   Pulse 69 5/8/2020  9:47 AM   Resp 14 5/8/2020  9:47 AM   SpO2 97 % 5/8/2020  9:47 AM

## 2021-03-15 ENCOUNTER — TRANSCRIBE ORDER (OUTPATIENT)
Dept: SCHEDULING | Age: 46
End: 2021-03-15

## 2021-03-15 DIAGNOSIS — Z12.31 SCREENING MAMMOGRAM FOR HIGH-RISK PATIENT: Primary | ICD-10-CM

## 2021-03-22 ENCOUNTER — HOSPITAL ENCOUNTER (OUTPATIENT)
Dept: MAMMOGRAPHY | Age: 46
Discharge: HOME | End: 2021-03-22
Attending: OBSTETRICS & GYNECOLOGY
Payer: COMMERCIAL

## 2021-03-22 DIAGNOSIS — Z12.31 SCREENING MAMMOGRAM FOR HIGH-RISK PATIENT: ICD-10-CM

## 2021-03-22 PROCEDURE — 77063 BREAST TOMOSYNTHESIS BI: CPT

## 2022-03-19 PROBLEM — E66.01 OBESITY, MORBID (HCC): Status: ACTIVE | Noted: 2018-11-13

## 2022-03-19 PROBLEM — N92.3 IMB (INTERMENSTRUAL BLEEDING): Status: ACTIVE | Noted: 2020-02-04

## 2022-06-14 ENCOUNTER — OFFICE VISIT (OUTPATIENT)
Dept: OBGYN CLINIC | Age: 47
End: 2022-06-14
Payer: COMMERCIAL

## 2022-06-14 VITALS
HEIGHT: 61 IN | DIASTOLIC BLOOD PRESSURE: 82 MMHG | WEIGHT: 266 LBS | SYSTOLIC BLOOD PRESSURE: 136 MMHG | BODY MASS INDEX: 50.22 KG/M2

## 2022-06-14 DIAGNOSIS — Z01.419 WELL WOMAN EXAM WITH ROUTINE GYNECOLOGICAL EXAM: Primary | ICD-10-CM

## 2022-06-14 DIAGNOSIS — Z12.4 SCREENING FOR CERVICAL CANCER: ICD-10-CM

## 2022-06-14 DIAGNOSIS — Z12.31 SCREENING MAMMOGRAM FOR BREAST CANCER: ICD-10-CM

## 2022-06-14 DIAGNOSIS — Z11.51 SCREENING FOR HUMAN PAPILLOMAVIRUS (HPV): ICD-10-CM

## 2022-06-14 PROCEDURE — 99396 PREV VISIT EST AGE 40-64: CPT | Performed by: OBSTETRICS & GYNECOLOGY

## 2022-06-14 ASSESSMENT — PATIENT HEALTH QUESTIONNAIRE - PHQ9
SUM OF ALL RESPONSES TO PHQ QUESTIONS 1-9: 0
2. FEELING DOWN, DEPRESSED OR HOPELESS: 0
1. LITTLE INTEREST OR PLEASURE IN DOING THINGS: 0
SUM OF ALL RESPONSES TO PHQ QUESTIONS 1-9: 0
SUM OF ALL RESPONSES TO PHQ9 QUESTIONS 1 & 2: 0

## 2022-06-14 NOTE — PROGRESS NOTES
Annual Exam  Established ptRigoberto Mariano   49 y.o.  1975  [id number]    Today:  2022    Presents for well woman annual exam.  Reports:  amenorrhea    BC:   IUD. No results found for: [id number], [id number], [id number], TRICHVAGNAA    No flowsheet data found. No flowsheet data found.     Past Medical History:   Diagnosis Date    BMI 50.0-59.9, adult ([facility] [address] 2019    BMI=47.3    Hypertension     on med for control        Past Surgical History:   Procedure Laterality Date     SECTION  2005    CHOLECYSTECTOMY      GYN      hysteroscopy, IUD insertion     ORTHOPEDIC SURGERY Right 1984    tumor removed from foot     OTHER SURGICAL HISTORY  2006    SKIN CANCER REMOVAL       Family History   Problem Relation Age of Onset    Deep Vein Thrombosis Neg Hx     Pulmonary Embolism Neg Hx     Breast Cancer Maternal Aunt 76    Breast Cancer Paternal Grandmother 72    Prostate Cancer Neg Hx     Ovarian Cancer Neg Hx     Colon Cancer Neg Hx     Hypertension Mother     Diabetes Father     Hypertension Father     Heart Disease Father     Stroke Father     Diabetes Maternal Uncle     Cancer Paternal Aunt         PAT GREAT AUNT-STOMACH    Diabetes Maternal Grandmother     Hypertension Maternal Grandmother     Breast Cancer Maternal Grandmother 72    Diabetes Paternal Grandmother         GREAT GRANDMOTHER       OB History    Para Term  AB Living   1 0 1 0 0 1   SAB IAB Ectopic Molar Multiple Live Births   0 0 0 0 0 0       Social History     Socioeconomic History    Marital status:      Spouse name: None    Number of children: None    Years of education: None    Highest education level: None   Occupational History    None   Tobacco Use    Smoking status: Never Smoker    Smokeless tobacco: Never Used   Vaping Use    Vaping Use: Never used   Substance and Sexual Activity    Alcohol use: No    Drug use: No    Sexual activity: Yes     Partners: Male Comment: iUD   Other Topics Concern    None   Social History Narrative    1.   from an accidental OD of rxd medication following brain surgery ~ 2019     Social Determinants of Health     Financial Resource Strain:     Difficulty of Paying Living Expenses: Not on file   Food Insecurity:     Worried About Running Out of Food in the Last Year: Not on file    Ben of Food in the Last Year: Not on file   Transportation Needs:     Lack of Transportation (Medical): Not on file    Lack of Transportation (Non-Medical): Not on file   Physical Activity:     Days of Exercise per Week: Not on file    Minutes of Exercise per Session: Not on file   Stress:     Feeling of Stress : Not on file   Social Connections:     Frequency of Communication with Friends and Family: Not on file    Frequency of Social Gatherings with Friends and Family: Not on file    Attends [employer] Services: Not on file    Active Member of [address] [place] [address] or Organizations: Not on file    Attends Club or Organization Meetings: Not on file    Marital Status: Not on file   Intimate Partner Violence:     Fear of Current or Ex-Partner: Not on file    Emotionally Abused: Not on file    Physically Abused: Not on file    Sexually Abused: Not on file   Housing Stability:     Unable to Pay for Housing in the Last Year: Not on file    Number of [place] in the Last Year: Not on file    Unstable Housing in the Last Year: Not on file       Current Outpatient Medications   Medication Sig    vitamin D (CHOLECALCIFEROL) 25 MCG (1000 UT) TABS tablet Take 2,000 Units by mouth daily    levonorgestrel (MIRENA, 52 MG,) IUD 52 mg 1 Device by IntraUTERine route once    telmisartan (MICARDIS) 40 MG tablet Take 40 mg by mouth daily     No current facility-administered medications for this visit. Physical Exam:/82   Ht 5' 1\" (1.549 m)   Wt 266 lb (120.7 kg)   BMI 50.26 kg/m² , No LMP recorded.  (Menstrual status: Other - See Notes). Patient is a 49 y.o. female who appears pleasant, in no apparent distress, her given age, well developed, well nourished and with good attention to hygiene and body habitus. Oriented to person, place and time. Mood and affect normal and appropriate to situation. Head is normocephalic, atraumatic, without any gross head or neck masses. Neck: Neck exam reveals no abnormalities. Thyroid ~ 1-2 cm/symmetric, no abnormalities. Lymph nodes:   Neck lymph nodes are normal.   No supraclavicular lymphadenopathy noted. No axillary lymphadenopathy noted. No groin lymphadenopathy noted. Cardiovascular: Heart auscultation reveals no murmurs, gallop, rubs or clicks. Skin: No skin rash, abnormal appearing nevi, subcutaneous nodules, lesions or ulcers observed. Abdomen: Abdomen soft, non tender, without palpable masses. Palpation of liver reveals no abnormalities with respect to size, tenderness or masses. Palpation of spleen reveals no abnormalities with respect to size, tenderness or masses. Breast: Symmetrical, no: dimpling, retractions, adenopathy, dominant masses or nipple discharge elicited. Breasts show no palpable masses or tenderness. Bilateral Fibrocystic change is: marked   No changes suspicious for malignancy      Genitourinary:  External genitalia are normal in appearance. Examination of urethral meatus reveals location normal and size normal.   Examination of urethra shows no abnormalities. Examination of vaginal vault reveals no abnormalities. Cervix: shows no pathology. Cervix,difficult to isolate, deviated to pt's left    IUD string seen  ~ 4 cm long  Uterus: Normal size, shape and contour. Suboptimals bimanual exam secondary to BMI    Adnexa and parametria show no masses, tenderness, organomegaly or nodularity. Suboptimal bimanual exam secondary to BMI    Examination of anus and perineum shows no abnormalities.    Rectal exam reveals normal sphincter tone without presence of hemorrhoids or masses. ASSESSMENT and PLAN    1.  AE. Importance of self breast exam reviewed, pt educated how to perform SBE.      2.  Her PCP Dr. Maribel Zapata     3. Covid and flu vaccs are NOT current. Tdap is current    4. No h/o abnormal paps. Patient requests pap today. 5.  Yearly dermatologic skin check advised. Patient will schedule at her convenience. No worrisome nevi noted on exam.  A list of local dermatologists was given to [address] [place] [address]. 6.   MMG is:  Scheduled for this Saturday    7.  6/23/2020 US:    Uterus 10/6/5  IUD IS WNL IN THE FUNDAL ENDOMETRIUM. KNOWN FIBROIDS. 2- 3. LARGEST IS ANTERIOR FUNDAL  SUBSEROSAL 3.4 cm (SEEN TA). RT OV-WNL. LT OV-SMALL, COMPLEX CYST. OVARIES SEEN TA ONLY. Wt Readings from Last 3 Encounters: Wt Readings from Last 3 Encounters:   06/14/22 266 lb (120.7 kg)   04/01/21 273 lb (123.8 kg)       BP Readings from Last 3 Encounters:  BP Readings from Last 3 Encounters:   06/14/22 136/82   04/01/21 124/78        Diagnosis Orders   1. Well woman exam with routine gynecological exam     2. Screening for cervical cancer  IGP, Aptima HPV   3. Screening for human papillomavirus (HPV)  IGP, Aptima HPV   4. Screening mammogram for breast cancer         Orders Placed This Encounter   Procedures    IGP, Aptima HPV     Dictated using voice recognition software.   Proofread but unrecognized errors may exist.    Signed by:  Jese House MD, MD

## 2022-06-18 ENCOUNTER — HOSPITAL ENCOUNTER (OUTPATIENT)
Dept: MAMMOGRAPHY | Age: 47
Discharge: HOME | End: 2022-06-21
Payer: COMMERCIAL

## 2022-06-18 DIAGNOSIS — Z12.31 VISIT FOR SCREENING MAMMOGRAM: ICD-10-CM

## 2022-06-18 PROCEDURE — 77063 BREAST TOMOSYNTHESIS BI: CPT

## 2022-06-20 LAB
CYTOLOGIST CVX/VAG CYTO: NORMAL
CYTOLOGY CVX/VAG DOC THIN PREP: NORMAL
HPV APTIMA: NEGATIVE
Lab: NORMAL
PATH REPORT.FINAL DX SPEC: NORMAL
STAT OF ADQ CVX/VAG CYTO-IMP: NORMAL

## 2023-06-28 ENCOUNTER — TRANSCRIBE ORDERS (OUTPATIENT)
Dept: SCHEDULING | Age: 48
End: 2023-06-28

## 2023-06-28 DIAGNOSIS — Z12.31 SCREENING MAMMOGRAM FOR HIGH-RISK PATIENT: Primary | ICD-10-CM

## 2023-07-15 ENCOUNTER — HOSPITAL ENCOUNTER (OUTPATIENT)
Dept: MAMMOGRAPHY | Age: 48
End: 2023-07-15
Attending: OBSTETRICS & GYNECOLOGY
Payer: COMMERCIAL

## 2023-07-15 DIAGNOSIS — Z12.31 SCREENING MAMMOGRAM FOR HIGH-RISK PATIENT: ICD-10-CM

## 2023-07-15 PROCEDURE — 77063 BREAST TOMOSYNTHESIS BI: CPT

## 2024-07-09 ENCOUNTER — OFFICE VISIT (OUTPATIENT)
Dept: OBGYN CLINIC | Age: 49
End: 2024-07-09
Payer: COMMERCIAL

## 2024-07-09 VITALS
HEIGHT: 61 IN | DIASTOLIC BLOOD PRESSURE: 80 MMHG | WEIGHT: 248 LBS | SYSTOLIC BLOOD PRESSURE: 122 MMHG | BODY MASS INDEX: 46.82 KG/M2

## 2024-07-09 DIAGNOSIS — Z12.4 SCREENING FOR CERVICAL CANCER: ICD-10-CM

## 2024-07-09 DIAGNOSIS — Z12.31 ENCOUNTER FOR SCREENING MAMMOGRAM FOR MALIGNANT NEOPLASM OF BREAST: ICD-10-CM

## 2024-07-09 DIAGNOSIS — Z11.51 SCREENING FOR HUMAN PAPILLOMAVIRUS (HPV): ICD-10-CM

## 2024-07-09 DIAGNOSIS — D22.9 ATYPICAL NEVI: ICD-10-CM

## 2024-07-09 DIAGNOSIS — Z12.83 SCREENING FOR SKIN CANCER: ICD-10-CM

## 2024-07-09 DIAGNOSIS — Z01.419 WELL WOMAN EXAM WITH ROUTINE GYNECOLOGICAL EXAM: Primary | ICD-10-CM

## 2024-07-09 PROCEDURE — 99459 PELVIC EXAMINATION: CPT | Performed by: OBSTETRICS & GYNECOLOGY

## 2024-07-09 PROCEDURE — 99396 PREV VISIT EST AGE 40-64: CPT | Performed by: OBSTETRICS & GYNECOLOGY

## 2024-07-09 RX ORDER — TELMISARTAN AND HYDROCHLORTHIAZIDE 80; 25 MG/1; MG/1
1 TABLET ORAL DAILY
COMMUNITY
Start: 2024-06-03

## 2024-07-09 NOTE — PROGRESS NOTES
Annual Exam  Established pt.     Abby Gonzalez   49 y.o.  1975  [id number]    Today:  24     Presents for well woman annual exam.  Reports:  She is doing well, denies gynecologic/other problems    BC:   IUD- occasional spotting, placed 2020, removal due 2028, watch FSH, may want to remove sooner.        2022 ov:   Annual exam.  Reports:  amenorrhea w/ Mirena IUD     BC: Mirena IUD.    Abdomen: Abdomen soft, non tender, without palpable masses.   Teardrop shaped nodule, per patient-->status post liver biopsy    Cervix: shows no pathology.  Cervix, difficult to isolate, deviated to pt's left  IUD string seen  ~ 4 cm long    7.  2020 US:    Uterus 10/6/5  IUD IS WNL IN THE FUNDAL ENDOMETRIUM.  KNOWN FIBROIDS. 2- 3. LARGEST IS ANTERIOR FUNDAL  SUBSEROSAL 3.4 cm (SEEN TA).  RT OV-WNL.  LT OV-SMALL, COMPLEX CYST.  OVARIES SEEN TA ONLY.       8.  Mirena IUD inserted 2020     9.  Cervix, difficult to isolate, deviated to pt's left  IUD string seen  ~ 4 cm long  Use large speculum,     10.   normal Cologuard, repeat      11.  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  HPV vaccine encouraged for her son, Abby reports she is discussing with their pediatrician    Past Medical History:   Diagnosis Date    BMI 50.0-59.9, adult ([facility]) 2019    BMI=47.3    Encounter for insertion of mirena IUD 2020    Hypertension     on med for control        Past Surgical History:   Procedure Laterality Date     SECTION  2005    CHOLECYSTECTOMY      GYN  2020    hysteroscopy, IUD insertion     ORTHOPEDIC SURGERY Right 1984    tumor removed from foot     OTHER SURGICAL HISTORY  2006    SKIN CANCER REMOVAL       Family History   Problem Relation Age of Onset    Hypertension Mother     Diabetes Father     Hypertension Father     Heart Disease Father     Stroke Father     Diabetes Sister     Diabetes Maternal Grandmother

## 2024-07-14 LAB
COLLECTION METHOD: NORMAL
CYTOLOGIST CVX/VAG CYTO: NORMAL
CYTOLOGY CVX/VAG DOC THIN PREP: NORMAL
HPV APTIMA: NEGATIVE
Lab: NORMAL
OTHER PT INFO: NORMAL
PAP SOURCE: NORMAL
PATH REPORT.FINAL DX SPEC: NORMAL
PREV CYTO INFO: NEGATIVE
PREV TREATMENT RESULTS: NORMAL
PREV TREATMENT: NORMAL
STAT OF ADQ CVX/VAG CYTO-IMP: NORMAL

## 2024-08-29 ENCOUNTER — HOSPITAL ENCOUNTER (OUTPATIENT)
Dept: MAMMOGRAPHY | Age: 49
Discharge: HOME | End: 2024-08-29
Attending: OBSTETRICS & GYNECOLOGY
Payer: COMMERCIAL

## 2024-08-29 DIAGNOSIS — Z12.31 ENCOUNTER FOR SCREENING MAMMOGRAM FOR MALIGNANT NEOPLASM OF BREAST: ICD-10-CM

## 2024-08-29 PROCEDURE — 77063 BREAST TOMOSYNTHESIS BI: CPT

## 2024-11-14 ENCOUNTER — PROCEDURE VISIT (OUTPATIENT)
Dept: OBGYN CLINIC | Age: 49
End: 2024-11-14

## 2024-11-14 ENCOUNTER — OFFICE VISIT (OUTPATIENT)
Dept: OBGYN CLINIC | Age: 49
End: 2024-11-14
Payer: COMMERCIAL

## 2024-11-14 VITALS
BODY MASS INDEX: 48.15 KG/M2 | SYSTOLIC BLOOD PRESSURE: 128 MMHG | WEIGHT: 255 LBS | DIASTOLIC BLOOD PRESSURE: 72 MMHG | HEIGHT: 61 IN

## 2024-11-14 DIAGNOSIS — Z97.5 BREAKTHROUGH BLEEDING ASSOCIATED WITH INTRAUTERINE DEVICE (IUD): ICD-10-CM

## 2024-11-14 DIAGNOSIS — N92.1 BREAKTHROUGH BLEEDING ASSOCIATED WITH INTRAUTERINE DEVICE (IUD): ICD-10-CM

## 2024-11-14 DIAGNOSIS — N85.2 BULKY OR ENLARGED UTERUS: ICD-10-CM

## 2024-11-14 DIAGNOSIS — D21.9 FIBROIDS: ICD-10-CM

## 2024-11-14 DIAGNOSIS — Z30.431 IUD CHECK UP: Primary | ICD-10-CM

## 2024-11-14 PROCEDURE — 99213 OFFICE O/P EST LOW 20 MIN: CPT | Performed by: OBSTETRICS & GYNECOLOGY

## 2024-11-14 NOTE — PROGRESS NOTES
to:       1)  bulky uterus with a history of fibroids at her last physical exam     2)  49 years old with an IUD & spotting  Abby reports occasional hot flashes    Today's US is stable compared with a previous US ~4 years ago (6/2020)    2.  Weight loss   4/2021-->                        273 pounds  7/2024-->                        248 pounds  Today 11/14/2024-->       255 pounds     Re ROS--> non gynecologic concerns referred back to her PCP or appropriate specialist.     No orders of the defined types were placed in this encounter.       Diagnosis Orders   1. IUD check up        2. Fibroids        3. Bulky or enlarged uterus        4. Breakthrough bleeding associated with intrauterine device (IUD)           More than 50% of this 25+ minute visit was spent addressing the above patient concerns including:  assessment, extensive chart review, reviewing pelvic US and counseling the patient about the above concerns including evaluation plan, treatment strategies & documentation.  Long conversation with patient, all her questions answered and addressed all her concerns.    Loretta Young MD, FACOG

## (undated) DEVICE — CYSTO/BLADDER IRRIGATION SET, REGULATING CLAMP

## (undated) DEVICE — PAD,NON-ADHERENT,3X8,STERILE,LF,1/PK: Brand: MEDLINE

## (undated) DEVICE — GOWN,REINF,POLY,ECL,PP SLV,XL: Brand: MEDLINE

## (undated) DEVICE — TRAY PREP DRY W/ PREM GLV 2 APPL 6 SPNG 2 UNDPD 1 OVERWRAP

## (undated) DEVICE — DRAPE,UNDERBUTTOCKS,PCH,STERILE: Brand: MEDLINE

## (undated) DEVICE — CONTAINER SPEC FRMLN 120ML --

## (undated) DEVICE — MINOR LITHOTOMY PACK: Brand: MEDLINE INDUSTRIES, INC.

## (undated) DEVICE — JELLY LUBRICATING 10GM PREFIL SYR LUBE

## (undated) DEVICE — DRAPE TWL SURG 16X26IN BLU ORB04] ALLCARE INC]

## (undated) DEVICE — SOLUTION IRRIG 3000ML 0.9% SOD CHL FLX CONT 0797208] ICU MEDICAL INC]

## (undated) DEVICE — CARDINAL HEALTH FLEXIBLE LIGHT HANDLE COVER: Brand: CARDINAL HEALTH